# Patient Record
(demographics unavailable — no encounter records)

---

## 2025-03-24 NOTE — HISTORY OF PRESENT ILLNESS
Spoke to Shira in MRI-pt not cooperating as well as they would like-asking if contrast is really needed; I spoke to NILS Linda and she said she would prefer it mainly for cervical MRI but with pt's behavior whatever they can get will have to do.     [Home] : at home, [unfilled] , at the time of the visit. [Medical Office: ([facility])___] : at the medical office located in  [Telehealth (audio & video)] : This visit was provided via telehealth using real-time 2-way audio visual technology. [Verbal consent obtained from patient] : the patient, [unfilled] [[place]] : Post-hospitalization from [facility] [Pneumonia] : Pneumonia [Respiratory failure with hypoxia] : Respiratory failure with hypoxia [Other: _____] : [unfilled] [Yes] : Yes [Admitted on: ___] : The patient was admitted on [unfilled] [Discharged on ___] : discharged on [unfilled] [Discharge Summary] : discharge summary [Pertinent Labs] : pertinent labs [Radiology Findings] : radiology findings [Discharge Med List] : discharge medication list [Med Reconciliation] : medication reconciliation has been completed [Patient Contacted By: ____] : and contacted by [unfilled] [FreeTextEntry2] : Ms Elizondo is a 29 yo female with no signif PMH, non-smoker, who developed cough, yellow sputum, SOB and fever following a week of URI sxs.  Upon admission she was hypoxemic requiring 3L O2, vbg 7.41/43, WBC 17, RVP negative, and CTA showed no PE but scattered extensive bilat patchy and ground glass opacities with consolidative subpleural nodular opacity (1.9cm) LLL; no lobar consolidation; L axillary LN 1.4cm, R hilar LN 1.8x1.5cm, L hilar LN .9x1.4cm, paraesophageal LN .9x1.4cm.  Mycoplasma IgM Ab +.  UA+ with culture growing E. faecalis.  She was treated with Ceftriaxone and Azithro, discharged on Augmentin and azithro; received duonebs and discharged on albuterol MDI prn, and was weaned to RA for discharge.  TTE was wnl.  Presently she feels improved from a breathing standpoint but R side anterior c/p just below R breast, that she felt in the hospital and was told it was from a lot of coughing, has increased.  She says it is sharp, is reproducible with pressing on the spot, hurts when she moves or coughs.  I asked her to take a deep breath while bracing her arm against this area and she says it decreased the pain somewhat.  She continues to cough bringing up clear phlegm occasionally tinged a little pinkish; no george blood.  She is not experiencing any SOB or wheezing.  O2 sat has been 93-95%.  No fever.  She has been taking walks without SOB.  Using the albuterol MDI makes her cough more so she doesn't like to use it.  No new c/o

## 2025-03-24 NOTE — ASSESSMENT
[FreeTextEntry1] : 29 yo female without signif PMH  # mycoplasma pna  # acute hypoxemic respiratory failure # multiple mediastinal and axillary LA # UTI # R sided musculoskeletal chest pain 2/2 coughing  - She is recovering from a respiratory standpoint although still coughing.  Sputum is clear.  Occasional pinkish tinge described.  O2 sat 93-95%. Ambulating - She is completing Abx for UTI and mycoplasma PNA - Azithro finished today, Augmentin tomorrow - We discussed her chest pain.  It is very c/w musculoskeletal type inflammation and exacerbated with coughing and movement, reproducible on pressing.  Advised her to take NSAID and try to brace her c/w when coughing.  Also try to take deep breaths to keep her air sacs healthy and open - We discussed seeking immediate medical attention or 911 for signs/sxs such as worsening SOB, worsening c/p, blood in sputum.  I gave her the office number for concerns/questions/appt - She will need f/u CT to eval resolution of LA and PNA 103

## 2025-03-24 NOTE — PHYSICAL EXAM
[No Acute Distress] : no acute distress [Well Nourished] : well nourished [Well Developed] : well developed [Well-Appearing] : well-appearing [No Respiratory Distress] : no respiratory distress  [No Accessory Muscle Use] : no accessory muscle use [34788 - High Complexity requires an extensive number of possible diagnoses and/or the management options, extensive complexity of the medical data (tests, etc.) to be reviewed, and a high risk of significant complications, morbidity, and/or mortality as w] : High Complexity  [de-identified] : Viewed ambulating without SOB, able to take deep breaths, no respiratory limitation to speech, cough did not sound junky [de-identified] : area of pain R ant chest is without skin abnormality